# Patient Record
Sex: FEMALE | Race: BLACK OR AFRICAN AMERICAN | Employment: UNEMPLOYED | URBAN - METROPOLITAN AREA
[De-identification: names, ages, dates, MRNs, and addresses within clinical notes are randomized per-mention and may not be internally consistent; named-entity substitution may affect disease eponyms.]

---

## 2021-05-19 ENCOUNTER — HOSPITAL ENCOUNTER (EMERGENCY)
Age: 36
Discharge: HOME | End: 2021-05-19
Attending: EMERGENCY MEDICINE
Payer: COMMERCIAL

## 2021-05-19 ENCOUNTER — APPOINTMENT (OUTPATIENT)
Dept: GENERAL RADIOLOGY | Age: 36
End: 2021-05-19
Payer: COMMERCIAL

## 2021-05-19 VITALS
HEIGHT: 66 IN | DIASTOLIC BLOOD PRESSURE: 55 MMHG | WEIGHT: 225 LBS | OXYGEN SATURATION: 99 % | HEART RATE: 80 BPM | RESPIRATION RATE: 18 BRPM | TEMPERATURE: 97.3 F | SYSTOLIC BLOOD PRESSURE: 119 MMHG | BODY MASS INDEX: 36.16 KG/M2

## 2021-05-19 DIAGNOSIS — R07.9 CHEST PAIN, UNSPECIFIED TYPE: Primary | ICD-10-CM

## 2021-05-19 LAB
A/G RATIO: 1.2 (ref 1.1–2.2)
ALBUMIN SERPL-MCNC: 4.1 G/DL (ref 3.4–5)
ALP BLD-CCNC: 60 U/L (ref 40–129)
ALT SERPL-CCNC: 23 U/L (ref 10–40)
ANION GAP SERPL CALCULATED.3IONS-SCNC: 13 MMOL/L (ref 3–16)
AST SERPL-CCNC: 18 U/L (ref 15–37)
BASOPHILS ABSOLUTE: 0 K/UL (ref 0–0.2)
BASOPHILS RELATIVE PERCENT: 0.5 %
BILIRUB SERPL-MCNC: <0.2 MG/DL (ref 0–1)
BUN BLDV-MCNC: 5 MG/DL (ref 7–20)
CALCIUM SERPL-MCNC: 9.3 MG/DL (ref 8.3–10.6)
CHLORIDE BLD-SCNC: 104 MMOL/L (ref 99–110)
CO2: 21 MMOL/L (ref 21–32)
CREAT SERPL-MCNC: 0.8 MG/DL (ref 0.6–1.1)
D DIMER: <200 NG/ML DDU (ref 0–229)
EOSINOPHILS ABSOLUTE: 0.1 K/UL (ref 0–0.6)
EOSINOPHILS RELATIVE PERCENT: 1.3 %
GFR AFRICAN AMERICAN: >60
GFR NON-AFRICAN AMERICAN: >60
GLOBULIN: 3.3 G/DL
GLUCOSE BLD-MCNC: 105 MG/DL (ref 70–99)
HCG QUALITATIVE: NEGATIVE
HCT VFR BLD CALC: 39.2 % (ref 36–48)
HEMOGLOBIN: 12.4 G/DL (ref 12–16)
LYMPHOCYTES ABSOLUTE: 1.8 K/UL (ref 1–5.1)
LYMPHOCYTES RELATIVE PERCENT: 34.1 %
MCH RBC QN AUTO: 26 PG (ref 26–34)
MCHC RBC AUTO-ENTMCNC: 31.7 G/DL (ref 31–36)
MCV RBC AUTO: 82.1 FL (ref 80–100)
MONOCYTES ABSOLUTE: 0.4 K/UL (ref 0–1.3)
MONOCYTES RELATIVE PERCENT: 7 %
NEUTROPHILS ABSOLUTE: 3.1 K/UL (ref 1.7–7.7)
NEUTROPHILS RELATIVE PERCENT: 57.1 %
PDW BLD-RTO: 15.6 % (ref 12.4–15.4)
PLATELET # BLD: 245 K/UL (ref 135–450)
PMV BLD AUTO: 10 FL (ref 5–10.5)
POTASSIUM REFLEX MAGNESIUM: 3.6 MMOL/L (ref 3.5–5.1)
PRO-BNP: 14 PG/ML (ref 0–124)
RBC # BLD: 4.78 M/UL (ref 4–5.2)
SODIUM BLD-SCNC: 138 MMOL/L (ref 136–145)
TOTAL PROTEIN: 7.4 G/DL (ref 6.4–8.2)
TROPONIN: <0.01 NG/ML
WBC # BLD: 5.4 K/UL (ref 4–11)

## 2021-05-19 PROCEDURE — 84484 ASSAY OF TROPONIN QUANT: CPT

## 2021-05-19 PROCEDURE — 84703 CHORIONIC GONADOTROPIN ASSAY: CPT

## 2021-05-19 PROCEDURE — 85379 FIBRIN DEGRADATION QUANT: CPT

## 2021-05-19 PROCEDURE — 99283 EMERGENCY DEPT VISIT LOW MDM: CPT

## 2021-05-19 PROCEDURE — 6370000000 HC RX 637 (ALT 250 FOR IP): Performed by: EMERGENCY MEDICINE

## 2021-05-19 PROCEDURE — 94640 AIRWAY INHALATION TREATMENT: CPT

## 2021-05-19 PROCEDURE — 80053 COMPREHEN METABOLIC PANEL: CPT

## 2021-05-19 PROCEDURE — 71045 X-RAY EXAM CHEST 1 VIEW: CPT

## 2021-05-19 PROCEDURE — 85025 COMPLETE CBC W/AUTO DIFF WBC: CPT

## 2021-05-19 PROCEDURE — 83880 ASSAY OF NATRIURETIC PEPTIDE: CPT

## 2021-05-19 PROCEDURE — 93005 ELECTROCARDIOGRAM TRACING: CPT | Performed by: EMERGENCY MEDICINE

## 2021-05-19 RX ORDER — PREDNISONE 10 MG/1
50 TABLET ORAL DAILY
Qty: 25 TABLET | Refills: 0 | Status: SHIPPED | OUTPATIENT
Start: 2021-05-19 | End: 2021-05-19 | Stop reason: SDUPTHER

## 2021-05-19 RX ORDER — ALBUTEROL SULFATE 90 UG/1
2 AEROSOL, METERED RESPIRATORY (INHALATION) EVERY 6 HOURS PRN
Qty: 1 INHALER | Refills: 3 | Status: SHIPPED | OUTPATIENT
Start: 2021-05-19

## 2021-05-19 RX ORDER — MULTIVITAMIN WITH FOLIC ACID 400 MCG
TABLET ORAL
COMMUNITY
Start: 2021-03-23

## 2021-05-19 RX ORDER — IBUPROFEN 200 MG
600 TABLET ORAL EVERY 8 HOURS PRN
Qty: 60 TABLET | Refills: 0 | Status: SHIPPED | OUTPATIENT
Start: 2021-05-19

## 2021-05-19 RX ORDER — IBUPROFEN 200 MG
600 TABLET ORAL EVERY 8 HOURS PRN
Qty: 60 TABLET | Refills: 0 | Status: SHIPPED | OUTPATIENT
Start: 2021-05-19 | End: 2021-05-19 | Stop reason: SDUPTHER

## 2021-05-19 RX ORDER — IPRATROPIUM BROMIDE AND ALBUTEROL SULFATE 2.5; .5 MG/3ML; MG/3ML
1 SOLUTION RESPIRATORY (INHALATION) ONCE
Status: COMPLETED | OUTPATIENT
Start: 2021-05-19 | End: 2021-05-19

## 2021-05-19 RX ORDER — ALBUTEROL SULFATE 90 UG/1
2 AEROSOL, METERED RESPIRATORY (INHALATION) EVERY 6 HOURS PRN
Qty: 1 INHALER | Refills: 3 | Status: SHIPPED | OUTPATIENT
Start: 2021-05-19 | End: 2021-05-19 | Stop reason: SDUPTHER

## 2021-05-19 RX ORDER — PREDNISONE 10 MG/1
50 TABLET ORAL DAILY
Qty: 25 TABLET | Refills: 0 | Status: SHIPPED | OUTPATIENT
Start: 2021-05-19 | End: 2021-05-24

## 2021-05-19 RX ADMIN — IPRATROPIUM BROMIDE AND ALBUTEROL SULFATE 1 AMPULE: .5; 3 SOLUTION RESPIRATORY (INHALATION) at 14:29

## 2021-05-19 RX ADMIN — PREDNISONE 50 MG: 20 TABLET ORAL at 14:22

## 2021-05-19 ASSESSMENT — HEART SCORE: ECG: 0

## 2021-05-19 NOTE — ED PROVIDER NOTES
[facility] Emergency Department    CHIEF COMPLAINT  Chief Complaint   Patient presents with    Chest Pain     pt states living with ppl who have cold symptoms. pt states no dx of COVID for herself or family- today stating she has hx of asthma, having sharp pain in center of chest raidates to neck ongoing since Saturday- consistent in nature. HISTORY OF PRESENT ILLNESS  Areli Israel is a 38 y.o. female  who presents to the ED complaining of left sided and central chest pain radiating to the neck ongoing since Saturday. No fevers. Minimal cough but no sputum. H/o asthma but not really wheezing. Denies hemoptysis, leg swelling, or DVT/PE history. Previously on chronic steroids for h/o sarcoidosis but not anymore. No abd pain or n/v/d. She has been exposed to others with COVID like symptoms but has never had this herself. She denies nasal congestion, headache or body aches. No other complaints, modifying factors or associated symptoms. I have reviewed the following from the nursing documentation.     Past Medical History:   Diagnosis Date    Asthma     Liver disease     Sarcoidosis      Past Surgical History:   Procedure Laterality Date    APPENDECTOMY      OVARIAN CYST REMOVAL       Family History   Problem Relation Age of Onset    Cancer Father     Asthma Mother     Cancer Paternal Uncle     Cancer Maternal Grandmother     Arthritis Maternal Grandmother     Cancer Maternal Grandfather      Social History     Socioeconomic History    Marital status: Single     Spouse name: Not on file    Number of children: Not on file    Years of education: Not on file    Highest education level: Not on file   Occupational History    Not on file   Tobacco Use    Smoking status: Never Smoker   Substance and Sexual Activity    Alcohol use: No    Drug use: No    Sexual activity: Yes     Partners: Male   Other Topics Concern    Not on file   Social History Narrative    Not on file     Social Determinants of Health     Financial Resource Strain:     Difficulty of Paying Living Expenses:    Food Insecurity:     Worried About Running Out of Food in the Last Year:     [address] in the Last Year:    Transportation Needs:     Lack of Transportation (Medical):  Lack of Transportation (Non-Medical):    Physical Activity:     Days of Exercise per Week:     Minutes of Exercise per Session:    Stress:     Feeling of Stress :    Social Connections:     Frequency of Communication with Friends and Family:     Frequency of Social Gatherings with Friends and Family:     Attends Nondenominational Services:     Active Member of Clubs or Organizations:     Attends Club or Organization Meetings:     Marital Status:    Intimate Partner Violence:     Fear of Current or Ex-Partner:     Emotionally Abused:     Physically Abused:     Sexually Abused:      No current facility-administered medications for this encounter. Current Outpatient Medications   Medication Sig Dispense Refill    albuterol sulfate HFA (PROAIR HFA) 108 (90 Base) MCG/ACT inhaler Inhale 2 puffs into the lungs every 6 hours as needed for Wheezing 1 Inhaler 3    ibuprofen (ADVIL) 200 MG tablet Take 3 tablets by mouth every 8 hours as needed for Pain 60 tablet 0    predniSONE (DELTASONE) 10 MG tablet Take 5 tablets by mouth daily for 5 days 25 tablet 0    gabapentin (NEURONTIN) 100 MG capsule Take 100 mg by mouth 3 times daily      Multiple Vitamin (DAILY-SHREE) TABS       prednisoLONE acetate (PRED FORTE) 1 % ophthalmic suspension Place 1 drop into the right eye 4 times daily 15 mL 0    fluticasone (FLONASE) 50 MCG/ACT nasal spray 1 spray by Nasal route daily 1 Bottle 0     Allergies   Allergen Reactions    Amoxicillin Swelling    Pcn [Penicillins] Swelling       REVIEW OF SYSTEMS  10 systems reviewed, pertinent positives per HPI otherwise noted to be negative.     PHYSICAL EXAM  BP (!) 119/55   Pulse 80   Temp mmol/L    Potassium reflex Magnesium 3.6 3.5 - 5.1 mmol/L    Chloride 104 99 - 110 mmol/L    CO2 21 21 - 32 mmol/L    Anion Gap 13 3 - 16    Glucose 105 (H) 70 - 99 mg/dL    BUN 5 (L) 7 - 20 mg/dL    CREATININE 0.8 0.6 - 1.1 mg/dL    GFR Non-African American >60 >60    GFR African American >60 >60    Calcium 9.3 8.3 - 10.6 mg/dL    Total Protein 7.4 6.4 - 8.2 g/dL    Albumin 4.1 3.4 - 5.0 g/dL    Albumin/Globulin Ratio 1.2 1.1 - 2.2    Total Bilirubin <0.2 0.0 - 1.0 mg/dL    Alkaline Phosphatase 60 40 - 129 U/L    ALT 23 10 - 40 U/L    AST 18 15 - 37 U/L    Globulin 3.3 g/dL   hCG, serum, qualitative   Result Value Ref Range    hCG Qual Negative Detects HCG level >10 MIU/mL   Troponin   Result Value Ref Range    Troponin <0.01 <0.01 ng/mL   Brain Natriuretic Peptide   Result Value Ref Range    Pro-BNP 14 0 - 124 pg/mL   D-dimer, quantitative   Result Value Ref Range    D-Dimer, Quant <200 0 - 229 ng/mL DDU   EKG 12 Lead   Result Value Ref Range    Ventricular Rate 89 BPM    Atrial Rate 89 BPM    P-R Interval 130 ms    QRS Duration 80 ms    Q-T Interval 366 ms    QTc Calculation (Bazett) 445 ms    P Axis 71 degrees    R Axis 44 degrees    T Axis 18 degrees    Diagnosis       Normal sinus rhythmPossible Left atrial enlargementBorderline ECG     The 12 lead EKG was interpreted by me as follows:  Rate: normal with a rate of 89  Rhythm: sinus  Axis: normal  Intervals: normal TN, narrow QRS, normal QTc  ST segments: no ST elevations or depressions  T waves: no abnormal inversions  Non-specific T wave changes: not present  Prior EKG comparison: No prior is currently available for comparison    RADIOLOGY    XR CHEST PORTABLE    Result Date: 5/19/2021  EXAMINATION: ONE XRAY VIEW OF THE CHEST 5/19/2021 2:25 pm COMPARISON: None HISTORY: ORDERING SYSTEM PROVIDED HISTORY: cough wheeze L sided CP TECHNOLOGIST PROVIDED HISTORY: Reason for exam:->cough wheeze L sided CP Reason for Exam: Chest Pain (pt states living with ppl who have cold symptoms. pt states no dx of COVID for herself or family- today stating she has hx of asthma, having sharp pain in center of chest radiates to neck ongoing since Saturday- consistent in nature. ) Acuity: Acute Type of Exam: Initial FINDINGS: The bones and soft tissues are unremarkable. Cardiopericarditis silhouette is within normal limits. Pulmonary vasculature is normal.  No focal confluent pulmonary infiltrate is identified. Subtle density overlying the lower lung zones felt to be related to overlap associated with overlying soft tissue/patient's breasts. No evidence of pleural effusion or pneumothorax. No evidence of acute cardiopulmonary disease. ED COURSE/MDM  Patient seen and evaluated. Old records reviewed. Labs and imaging reviewed and results discussed with patient. After initial evaluation, differential diagnostic considerations included: acute coronary syndrome, pulmonary embolism, COPD/asthma, pneumonia, musculoskeletal, reflux/PUD/gastritis, pneumothorax, CHF, thoracic aortic dissection, anxiety    The patient's ED workup was notable for reproducible chest wall discomfort probably costochondritic, chest x-ray clear, D-dimer negative and low risk for PE, EKG unremarkable, low heart score, troponin negative. Treated with NSAIDs. She was given a breathing treatment and prednisone here. She was given an inhaler NSAIDs and prednisone burst for home. HEART SCORE:    History: 0  EC  Patient Age: 1  *Risk factors for Atherosclerotic disease: Obesity; Positive family History  Risk Factors: 1  Troponin: 0  Heart Score Total: 1      Heart score: 1.   This falls under the following category: Score of 0-3, which indicates a very low risk for major adverse cardiac event and supports early discharge      During the patient's ED course, the patient was given:  Medications   ipratropium-albuterol (DUONEB) nebulizer solution 1 ampule (1 ampule Inhalation Given 21 7144) predniSONE (DELTASONE) tablet 50 mg (50 mg Oral Given 5/19/21 1422)        CLINICAL IMPRESSION  1. Chest pain, unspecified type        Blood pressure (!) 119/55, pulse 80, temperature 97.3 °F (36.3 °C), temperature source Temporal, resp. rate 18, height 5' 6\" (1.676 m), weight 225 lb (102.1 kg), last menstrual period 04/30/2021, SpO2 99 %. Nancy Galicia was discharged to home in stable condition. I have discussed the findings of today's workup with the patient and addressed the patient's questions and concerns. Important warning signs as well as new or worsening symptoms which would necessitate immediate return to the ED were discussed. The plan is to discharge from the ED at this time, and the patient is in stable condition. The patient acknowledged understanding is agreeable with this plan. Patient was given scripts for the following medications. I counseled patient how to take these medications. Discharge Medication List as of 5/19/2021  3:49 PM      START taking these medications    Details   ibuprofen (ADVIL) 200 MG tablet Take 3 tablets by mouth every 8 hours as needed for Pain, Disp-60 tablet, R-0Print             Follow-up with:  [facility]    Schedule an appointment as soon as possible for a visit in 1 week  For symptom re-evaluation    [facility] Emergency Department  [address] [facility]  [address]  Go to   If symptoms worsen      DISCLAIMER: This chart was created using [employer] Efforts were made by me to ensure accuracy, however some errors may be present due to limitations of this technology and occasionally words are not transcribed correctly. \        Torin Morrissey MD  05/19/21 4699

## 2021-05-20 LAB
EKG ATRIAL RATE: 89 BPM
EKG DIAGNOSIS: NORMAL
EKG P AXIS: 71 DEGREES
EKG P-R INTERVAL: 130 MS
EKG Q-T INTERVAL: 366 MS
EKG QRS DURATION: 80 MS
EKG QTC CALCULATION (BAZETT): 445 MS
EKG R AXIS: 44 DEGREES
EKG T AXIS: 18 DEGREES
EKG VENTRICULAR RATE: 89 BPM

## 2021-05-20 PROCEDURE — 93010 ELECTROCARDIOGRAM REPORT: CPT | Performed by: INTERNAL MEDICINE

## 2021-06-07 ENCOUNTER — HOSPITAL ENCOUNTER (EMERGENCY)
Age: 36
Discharge: HOME | End: 2021-06-07
Payer: COMMERCIAL

## 2021-06-07 ENCOUNTER — APPOINTMENT (OUTPATIENT)
Dept: GENERAL RADIOLOGY | Age: 36
End: 2021-06-07
Payer: COMMERCIAL

## 2021-06-07 VITALS
RESPIRATION RATE: 16 BRPM | HEIGHT: 66 IN | WEIGHT: 225 LBS | DIASTOLIC BLOOD PRESSURE: 85 MMHG | HEART RATE: 94 BPM | SYSTOLIC BLOOD PRESSURE: 148 MMHG | BODY MASS INDEX: 36.16 KG/M2 | TEMPERATURE: 97.5 F | OXYGEN SATURATION: 100 %

## 2021-06-07 DIAGNOSIS — S99.922A INJURY OF LEFT FOOT, INITIAL ENCOUNTER: Primary | ICD-10-CM

## 2021-06-07 PROCEDURE — 73610 X-RAY EXAM OF ANKLE: CPT

## 2021-06-07 PROCEDURE — 73630 X-RAY EXAM OF FOOT: CPT

## 2021-06-07 PROCEDURE — 99283 EMERGENCY DEPT VISIT LOW MDM: CPT

## 2021-06-07 ASSESSMENT — ENCOUNTER SYMPTOMS
SHORTNESS OF BREATH: 0
NAUSEA: 0
VOMITING: 0
WHEEZING: 0
DIARRHEA: 0
COUGH: 0
RHINORRHEA: 0
ABDOMINAL PAIN: 0

## 2021-06-07 ASSESSMENT — PAIN SCALES - GENERAL: PAINLEVEL_OUTOF10: 7

## 2021-06-07 ASSESSMENT — PAIN DESCRIPTION - PAIN TYPE: TYPE: ACUTE PAIN

## 2021-06-08 NOTE — ED PROVIDER NOTES
Musculoskeletal: Positive for arthralgias. Negative for neck pain and neck stiffness. Skin: Negative for rash. Neurological: Negative for weakness, numbness and headaches. Positives and Pertinent negatives as per HPI. Except as noted above in the ROS, all other systems were reviewed and negative. PAST MEDICAL HISTORY     Past Medical History:   Diagnosis Date    Asthma     Liver disease     Sarcoidosis          SURGICAL HISTORY     Past Surgical History:   Procedure Laterality Date    APPENDECTOMY      OVARIAN CYST REMOVAL           CURRENTMEDICATIONS       Previous Medications    ALBUTEROL SULFATE HFA (PROAIR HFA) 108 (90 BASE) MCG/ACT INHALER    Inhale 2 puffs into the lungs every 6 hours as needed for Wheezing    FLUTICASONE (FLONASE) 50 MCG/ACT NASAL SPRAY    1 spray by Nasal route daily    IBUPROFEN (ADVIL) 200 MG TABLET    Take 3 tablets by mouth every 8 hours as needed for Pain    MULTIPLE VITAMIN (DAILY-SHREE) TABS        PREDNISOLONE ACETATE (PRED FORTE) 1 % OPHTHALMIC SUSPENSION    Place 1 drop into the right eye 4 times daily         ALLERGIES     Amoxicillin and Pcn [penicillins]    FAMILYHISTORY       Family History   Problem Relation Age of Onset    Cancer Father     Asthma Mother     Cancer Paternal Uncle     Cancer Maternal Grandmother     Arthritis Maternal Grandmother     Cancer Maternal Grandfather           SOCIAL HISTORY       Social History     Tobacco Use    Smoking status: Never Smoker    Smokeless tobacco: Never Used   Substance Use Topics    Alcohol use: No    Drug use: No       SCREENINGS             PHYSICAL EXAM    (up to 7 for level 4, 8 or more for level 5)     ED Triage Vitals [06/07/21 2130]   BP Temp Temp Source Pulse Resp SpO2 Height Weight   (!) 148/85 97.5 °F (36.4 °C) Infrared 94 16 100 % 5' 6\" (1.676 m) 225 lb (102.1 kg)       Physical Exam  Vitals and nursing note reviewed. Constitutional:       Appearance: She is well-developed.  She is not diaphoretic. HENT:      Head: Normocephalic and atraumatic. Right Ear: External ear normal.      Left Ear: External ear normal.      Nose: Nose normal.   Eyes:      General:         Right eye: No discharge. Left eye: No discharge. Cardiovascular:      Pulses: Normal pulses. Pulmonary:      Effort: Pulmonary effort is normal. No respiratory distress. Musculoskeletal:         General: Normal range of motion. Cervical back: Normal range of motion and neck supple. Left foot: Swelling and tenderness present. Feet:    Skin:     General: Skin is warm and dry. Neurological:      Mental Status: She is alert and oriented to person, place, and time. Sensory: Sensation is intact. Motor: Motor function is intact. Psychiatric:         Behavior: Behavior normal.         DIAGNOSTIC RESULTS   LABS:    Labs Reviewed - No data to display    All other labs were within normal range or not returned as of this dictation. EKG: All EKG's are interpreted by the Emergency Department Physician in the absence of a cardiologist.  Please see their note for interpretation of EKG. RADIOLOGY:   Non-plain film images such as CT, Ultrasound and MRI are read by the radiologist. Plain radiographic images are visualized and preliminarily interpreted by the ED Provider with the below findings:        Interpretation per the Radiologist below, if available at the time of this note:    XR FOOT LEFT (MIN 3 VIEWS)   Final Result   No acute abnormality seen. XR ANKLE LEFT (MIN 3 VIEWS)   Final Result   No abnormality seen. No results found.         PROCEDURES   Unless otherwise noted below, none     Procedures    CRITICAL CARE TIME   N/A    CONSULTS:  None      EMERGENCY DEPARTMENT COURSE and DIFFERENTIAL DIAGNOSIS/MDM:   Vitals:    Vitals:    06/07/21 2130   BP: (!) 148/85   Pulse: 94   Resp: 16   Temp: 97.5 °F (36.4 °C)   TempSrc: Infrared   SpO2: 100%   Weight: 225 lb (102.1 kg) Height: 5' 6\" (1.676 m)       Patient was given the following medications:  Medications - No data to display        Patient presents for evaluation of left foot pain status post injury that occurred yesterday. On exam, she is resting comfortably in bed no acute distress and nontoxic. Nose are stable and she is afebrile. She does have tenderness and mild swelling to the plantar aspect of the left foot as illustrated above. Also mild tenderness over the medial malleoli. There is no step-offs crepitus obvious deformity or dislocation. Range of motion of ankle is intact. She is able to wiggle her toes. X-ray left foot and ankle show no acute abnormalities. She was given Ace wrap and postop shoe for symptomatic and supportive care. Additional symptomatic and supportive care was discussed including rest, ice elevation. She can take Tylenol and/or ibuprofen. Encourage follow-up with PCP within 1 week if there is no significant symptomatic improvement. I estimate there is LOW risk for COMPARTMENT SYNDROME, DEEP VENOUS THROMBOSIS, SEPTIC ARTHRITIS, TENDON OR NEUROVASCULAR INJURY, thus I consider the discharge disposition reasonable. Conditions for return to the ED were discussed such as any new or worsening symptoms or signs of neurovascular compromise, intractable pain or inability to ambulate. .  She is agreeable to this plan and stable for discharge at this time. FINAL IMPRESSION      1.  Injury of left foot, initial encounter          DISPOSITION/PLAN   DISPOSITION Decision To Discharge 06/07/2021 10:53:43 PM      PATIENT REFERRED TO:  [place]    Call   For a re-check in  5-7  days if no improvement    [facility] Emergency Department  [place] [address]  [phone]  Go to   If symptoms worsen      DISCHARGE MEDICATIONS:  New Prescriptions    No medications on file       DISCONTINUED MEDICATIONS:  Discontinued Medications    GABAPENTIN (NEURONTIN) 100 MG CAPSULE    Take 100 mg by mouth 3 times daily              (Please note that portions of this note were completed with a voice recognition program.  Efforts were made to edit the dictations but occasionally words are mis-transcribed.)    France Tay PA-C (electronically signed)            Agustin Rodriguez PA-C  06/07/21 [id number]

## 2021-06-08 NOTE — ED NOTES
Nursing Discharge Notes:  -Patient discharged at this time in no acute distress after verbalizing understanding of discharge instructions.  -A copy of the AVS was reviewed with pt.  -Pt was given the opportunity to ask questions before signing for discharge.     -Pt left ambulatory to lobby / discharge area.      Patient Education:  Learner - Patient  Motivation and Readiness To Learn - Medium to High  Barriers To Learning - None  Learning Preference / Provided Instructions - Both written and verbal discharge instructions.      Fátima Vale RN  06/07/21 6364

## 2021-06-29 ENCOUNTER — APPOINTMENT (OUTPATIENT)
Dept: GENERAL RADIOLOGY | Age: 36
End: 2021-06-29
Payer: COMMERCIAL

## 2021-06-29 ENCOUNTER — HOSPITAL ENCOUNTER (EMERGENCY)
Age: 36
Discharge: HOME | End: 2021-06-29
Payer: COMMERCIAL

## 2021-06-29 VITALS
WEIGHT: 225 LBS | TEMPERATURE: 98.5 F | OXYGEN SATURATION: 98 % | SYSTOLIC BLOOD PRESSURE: 121 MMHG | HEIGHT: 66 IN | RESPIRATION RATE: 16 BRPM | DIASTOLIC BLOOD PRESSURE: 57 MMHG | HEART RATE: 92 BPM | BODY MASS INDEX: 36.16 KG/M2

## 2021-06-29 DIAGNOSIS — M79.672 LEFT FOOT PAIN: Primary | ICD-10-CM

## 2021-06-29 DIAGNOSIS — M25.562 LEFT KNEE PAIN, UNSPECIFIED CHRONICITY: ICD-10-CM

## 2021-06-29 PROCEDURE — 93971 EXTREMITY STUDY: CPT

## 2021-06-29 PROCEDURE — 6370000000 HC RX 637 (ALT 250 FOR IP): Performed by: PHYSICIAN ASSISTANT

## 2021-06-29 PROCEDURE — 99283 EMERGENCY DEPT VISIT LOW MDM: CPT

## 2021-06-29 PROCEDURE — 73562 X-RAY EXAM OF KNEE 3: CPT

## 2021-06-29 PROCEDURE — 73630 X-RAY EXAM OF FOOT: CPT

## 2021-06-29 RX ORDER — HYDROCODONE BITARTRATE AND ACETAMINOPHEN 5; 325 MG/1; MG/1
1 TABLET ORAL ONCE
Status: COMPLETED | OUTPATIENT
Start: 2021-06-29 | End: 2021-06-29

## 2021-06-29 RX ADMIN — HYDROCODONE BITARTRATE AND ACETAMINOPHEN 1 TABLET: 5; 325 TABLET ORAL at 14:11

## 2021-06-29 ASSESSMENT — PAIN SCALES - GENERAL
PAINLEVEL_OUTOF10: 10
PAINLEVEL_OUTOF10: 10

## 2021-06-29 ASSESSMENT — ENCOUNTER SYMPTOMS
VOMITING: 0
NAUSEA: 0

## 2021-06-29 NOTE — ED PROVIDER NOTES
[facility]        Pt Name: Cassidy Arnett  MRN: [id number]  [place] 1985  Date of evaluation: 6/29/2021  Provider: Bautista Ocampo PA-C  PCP: [facility]  Note Started: 4:47 PM EDT       VERO. I have evaluated this patient. My supervising physician was available for consultation. CHIEF COMPLAINT       Chief Complaint   Patient presents with    Foot Injury     hurt herself at [address] 6/7, patient was seen here, now having right knee and right foot problems, pt reports that she didnt get into Ortho        HISTORY OF PRESENT ILLNESS   (Location, Timing/Onset, Context/Setting, Quality, Duration, Modifying Factors, Severity, Associated Signs and Symptoms)  Note limiting factors. Chief Complaint: Left knee, and left foot pain. Cassidy Arnett is a 38 y.o. female who presents to the emergency department today for evaluation for left knee and left foot pain. The patient states that she injured herself by falling at [address] on 6/6/2021, the patient states that she was seen here the next day, had x-rays, her x-rays were normal.  The patient states that she has kept her boot on, and she states that overall seem to be doing well, however after trying to take the boot off and return to normal activity she has noticed increasing pain. The patient states that the other day when she stood up off the couch she noticed increasing pain to her left knee, swelling and some pain to her calf. The patient is rating all of her discomfort as a 10/10, her pain is constant, worse with touch and certain movements. She denies any numbness, tingling or weakness. No fever chills. No nausea or vomiting, the patient otherwise has no complaints or injuries at this time    Nursing Notes were all reviewed and agreed with or any disagreements were addressed in the HPI.     REVIEW OF SYSTEMS    (2-9 systems for level 4, 10 or more for level 5) Review of Systems   Constitutional: Negative for activity change, appetite change and fever. Gastrointestinal: Negative for nausea and vomiting. Musculoskeletal: Positive for arthralgias. Skin: Negative for wound. Neurological: Negative for weakness and numbness. Positives and Pertinent negatives as per HPI. Except as noted above in the ROS, all other systems were reviewed and negative.        PAST MEDICAL HISTORY     Past Medical History:   Diagnosis Date    Asthma     Liver disease     Sarcoidosis          SURGICAL HISTORY     Past Surgical History:   Procedure Laterality Date    APPENDECTOMY      OVARIAN CYST REMOVAL           CURRENTMEDICATIONS       Discharge Medication List as of 6/29/2021  4:11 PM      CONTINUE these medications which have NOT CHANGED    Details   Multiple Vitamin (DAILY-SHREE) TABS Historical Med      albuterol sulfate HFA (PROAIR HFA) 108 (90 Base) MCG/ACT inhaler Inhale 2 puffs into the lungs every 6 hours as needed for Wheezing, Disp-1 Inhaler, R-3Print      ibuprofen (ADVIL) 200 MG tablet Take 3 tablets by mouth every 8 hours as needed for Pain, Disp-60 tablet, R-0Print      prednisoLONE acetate (PRED FORTE) 1 % ophthalmic suspension Place 1 drop into the right eye 4 times daily, Disp-15 mL, R-0      fluticasone (FLONASE) 50 MCG/ACT nasal spray 1 spray by Nasal route daily, Disp-1 Bottle, R-0               ALLERGIES     Amoxicillin and Pcn [penicillins]    FAMILYHISTORY       Family History   Problem Relation Age of Onset    Cancer Father     Asthma Mother     Cancer Paternal Uncle     Cancer Maternal Grandmother     Arthritis Maternal Grandmother     Cancer Maternal Grandfather           SOCIAL HISTORY       Social History     Tobacco Use    Smoking status: Never Smoker    Smokeless tobacco: Never Used   Substance Use Topics    Alcohol use: No    Drug use: No       SCREENINGS             PHYSICAL EXAM    (up to 7 for level 4, 8 or more for level 5)     ED Triage Vitals [06/29/21 1350]   BP Temp Temp src Pulse Resp SpO2 Height Weight   (!) 121/57 98.5 °F (36.9 °C) -- 92 16 98 % 5' 6\" (1.676 m) 225 lb (102.1 kg)       Physical Exam  Vitals and nursing note reviewed. Constitutional:       Appearance: She is well-developed. She is not diaphoretic. HENT:      Head: Normocephalic and atraumatic. Right Ear: External ear normal.      Left Ear: External ear normal.      Nose: Nose normal.   Eyes:      General:         Right eye: No discharge. Left eye: No discharge. Neck:      Trachea: No tracheal deviation. Cardiovascular:      Pulses: Normal pulses. Pulmonary:      Effort: Pulmonary effort is normal. No respiratory distress. Musculoskeletal:         General: Normal range of motion. Cervical back: Normal range of motion and neck supple. Comments: There is diffuse tenderness noted about the left foot, and over the left knee. Dorsalis pedis and posterior tibialis pulse are 2+. Normal sensation light touch per neurovascularly intact. Full passive range of motion of all joints. Patient has tenderness to palpation to her posterior calf. Negative Homans' sign. No erythema, warmth or edema over the joints. Skin:     General: Skin is warm and dry. Neurological:      Mental Status: She is alert and oriented to person, place, and time. Psychiatric:         Behavior: Behavior normal.         DIAGNOSTIC RESULTS   LABS:    Labs Reviewed - No data to display    When ordered only abnormal lab results are displayed. All other labs were within normal range or not returned as of this dictation. EKG: When ordered, EKG's are interpreted by the Emergency Department Physician in the absence of a cardiologist.  Please see their note for interpretation of EKG.     RADIOLOGY:   Non-plain film images such as CT, Ultrasound and MRI are read by the radiologist. Plain radiographic images are visualized and preliminarily interpreted by the ED Provider with the below findings:        Interpretation per the Radiologist below, if available at the time of this note:    VL Extremity Venous Left         XR KNEE LEFT (3 VIEWS)   Final Result   No acute bone or joint abnormality. XR FOOT LEFT (MIN 3 VIEWS)   Final Result   No acute bone or joint abnormality. XR KNEE LEFT (3 VIEWS)    Result Date: 6/29/2021  EXAMINATION: THREE XRAY VIEWS OF THE LEFT KNEE 6/29/2021 2:19 pm COMPARISON: None. HISTORY: ORDERING SYSTEM PROVIDED HISTORY: pain TECHNOLOGIST PROVIDED HISTORY: Reason for exam:->pain Reason for Exam: Foot Injury (hurt herself at [facility] 6/7, patient was seen here, now having right knee and right foot problems, pt reports that she didnt get into Ortho ) Acuity: Acute Type of Exam: Subsequent/Follow-up FINDINGS: There is no acute fracture or dislocation. Joint spaces are unremarkable for age. Surrounding soft tissues are unremarkable. No significant knee joint effusion. No acute bone or joint abnormality. XR FOOT LEFT (MIN 3 VIEWS)    Result Date: 6/29/2021  EXAMINATION: THREE XRAY VIEWS OF THE LEFT FOOT 6/29/2021 2:19 pm COMPARISON: Left ankle same day: HISTORY: ORDERING SYSTEM PROVIDED HISTORY: pain TECHNOLOGIST PROVIDED HISTORY: Reason for exam:->pain Reason for Exam: Foot Injury (hurt herself at [facility] 6/7, patient was seen here, now having right knee and right foot problems, pt reports that she didnt get into Ortho ) Acuity: Acute Type of Exam: Subsequent/Follow-up FINDINGS: There is no acute fracture or dislocation. Joint spaces are unremarkable for age. Surrounding soft tissues are unremarkable. No acute bone or joint abnormality.      VL Extremity Venous Left    Result Date: 6/29/2021  Vascular Lower Extremities DVT Study Procedure -- PRELIMINARY SONOGRAPHER REPORT --   Demographics   Patient Name      Yamil Newton   Date of Study     06/29/2021         Gender              Female   Patient Number    [id number]         Date of Birth       1985   Visit Number      [id number]          Age                 38 year(s)   Accession Number  [id number]          Room Number            Corporate ID      [id number]           Sonographer         Samira Wood,                                                           [address]   Ordering          Indra Montalvo        [facility]  Physician         VERONIQUE               Physician  Procedure Type of Study:   Veins:Lower Extremities DVT Study, VL EXTREMITY VENOUS DUPLEX LEFT. Tech Comments Left No evidence of deep vein or superficial vein thrombosis involving the left lower extremity and the right common femoral vein. PROCEDURES   Unless otherwise noted below, none     Procedures    CRITICAL CARE TIME   N/A    CONSULTS:  None      EMERGENCY DEPARTMENT COURSE and DIFFERENTIAL DIAGNOSIS/MDM:   Vitals:    Vitals:    06/29/21 1350   BP: (!) 121/57   Pulse: 92   Resp: 16   Temp: 98.5 °F (36.9 °C)   SpO2: 98%   Weight: 225 lb (102.1 kg)   Height: 5' 6\" (1.676 m)       Patient was given the following medications:  Medications   HYDROcodone-acetaminophen (NORCO) 5-325 MG per tablet 1 tablet (1 tablet Oral Given 6/29/21 1411)           Briefly, this is a 42-year-old female who presents the emergency department today for evaluation of her left foot and left ankle pain. The patient states that she was seen in the emergency room on 6/7/2021 after falling and injuring herself at [facility]. The patient states that she has not yet followed up with orthopedics or any other specialty. She states that she try to get up a couple of days ago and noticed increasing pain to her left knee    On examination, the patient has diffuse tenderness over the left foot over the left knee, she is neurovascular intact foot x-rays obtained and are negative.     Patient is reporting some pain to the calf, therefore ultrasound was obtained due to her recent injury, which was negative for any DVT      I feel that

## 2021-08-29 ENCOUNTER — HOSPITAL ENCOUNTER (EMERGENCY)
Age: 36
Discharge: HOME | End: 2021-08-30
Payer: COMMERCIAL

## 2021-08-29 ENCOUNTER — APPOINTMENT (OUTPATIENT)
Dept: GENERAL RADIOLOGY | Age: 36
End: 2021-08-29
Payer: COMMERCIAL

## 2021-08-29 DIAGNOSIS — K59.00 CONSTIPATION, UNSPECIFIED CONSTIPATION TYPE: Primary | ICD-10-CM

## 2021-08-29 DIAGNOSIS — J45.909 UNCOMPLICATED ASTHMA, UNSPECIFIED ASTHMA SEVERITY, UNSPECIFIED WHETHER PERSISTENT: ICD-10-CM

## 2021-08-29 LAB — HCG(URINE) PREGNANCY TEST: NEGATIVE

## 2021-08-29 PROCEDURE — 74022 RADEX COMPL AQT ABD SERIES: CPT

## 2021-08-29 PROCEDURE — 99283 EMERGENCY DEPT VISIT LOW MDM: CPT

## 2021-08-29 PROCEDURE — 84703 CHORIONIC GONADOTROPIN ASSAY: CPT

## 2021-08-29 ASSESSMENT — PAIN DESCRIPTION - DESCRIPTORS: DESCRIPTORS: ACHING;TIGHTNESS

## 2021-08-29 ASSESSMENT — PAIN DESCRIPTION - ORIENTATION: ORIENTATION: MID;UPPER

## 2021-08-29 ASSESSMENT — PAIN DESCRIPTION - PAIN TYPE: TYPE: ACUTE PAIN

## 2021-08-29 ASSESSMENT — PAIN DESCRIPTION - LOCATION: LOCATION: BACK

## 2021-08-29 ASSESSMENT — PAIN SCALES - GENERAL: PAINLEVEL_OUTOF10: 8

## 2021-08-30 VITALS
BODY MASS INDEX: 34.5 KG/M2 | WEIGHT: 219.8 LBS | HEART RATE: 103 BPM | RESPIRATION RATE: 14 BRPM | TEMPERATURE: 99.6 F | DIASTOLIC BLOOD PRESSURE: 70 MMHG | SYSTOLIC BLOOD PRESSURE: 113 MMHG | OXYGEN SATURATION: 100 % | HEIGHT: 67 IN

## 2021-08-30 RX ORDER — METHYLPREDNISOLONE 4 MG/1
TABLET ORAL
Qty: 1 KIT | Refills: 0 | Status: SHIPPED | OUTPATIENT
Start: 2021-08-30 | End: 2021-09-05

## 2021-08-30 NOTE — ED PROVIDER NOTES
This result has been copied by Nicole Shore. The result was interpreted by Yas Hinkle, OD on 10/28/19.  The original documentation can be found under the encounter date 10/28/19.  Plan: 24-2 visual field and IOP check, OCT-TODAY /CONTINUE LATANOPROST QHS OU     AS: MILD ARMD OU  PL OCT SCANS TODAY     PRIOR PACH SHOWS THIN CORNEAS OU             OCT SCANS TODAY SHOW DRY ARMD OD WET ARMD OS/SCANS OF GCC SHOW THINNING INFERIOR OD AND NASAL OS/SCANS OF RNFL SHOW INFERIOR THINNING RT EYE AND SUPERIOR THINNING LEFT EYE/GPA LOOKS VERY STABLE SINCE 2018 WHEN BEGAN TREATMENT/VISUAL FIELD GOOD COOPERATION OU  BOTH EYES SUPERIOR SCOTOMA BUT NOT WORSE THAN LAST FIELD,CONSISTENT WITH OCT SCANS RT EYE BUT NOT LEFT EYE  IOP AT [facility]/CONTINUE DROPS RT 6 MONTH IOP THEN 1 YEAR FOR FULL VF AND OCT    RETINA CONSULT      chest pain, palpitations. Gastrointestinal: Positive for constipation. Negative for vomiting, abdominal pain, blood in stool. Musculoskeletal:  Negative for myalgias, arthralgias, neck pain or stiffness. Neurological:  Negative for syncope, dizziness, focal weakness, numbness. All other positives and pertinent negatives as per HPI. PAST MEDICAL HISTORY         Diagnosis Date    Asthma     Liver disease     Sarcoidosis        SURGICAL HISTORY           Procedure Laterality Date    APPENDECTOMY      OVARIAN CYST REMOVAL         CURRENT MEDICATIONS       Discharge Medication List as of 2021 12:45 AM      CONTINUE these medications which have NOT CHANGED    Details   Multiple Vitamin (DAILY-SHREE) TABS Historical Med      albuterol sulfate HFA (PROAIR HFA) 108 (90 Base) MCG/ACT inhaler Inhale 2 puffs into the lungs every 6 hours as needed for Wheezing, Disp-1 Inhaler, R-3Print      ibuprofen (ADVIL) 200 MG tablet Take 3 tablets by mouth every 8 hours as needed for Pain, Disp-60 tablet, R-0Print      prednisoLONE acetate (PRED FORTE) 1 % ophthalmic suspension Place 1 drop into the right eye 4 times daily, Disp-15 mL, R-0      fluticasone (FLONASE) 50 MCG/ACT nasal spray 1 spray by Nasal route daily, Disp-1 Bottle, R-0             ALLERGIES     Amoxicillin and Pcn [penicillins]    FAMILY HISTORY           Problem Relation Age of Onset    Cancer Father     Asthma Mother     Cancer Paternal Uncle     Cancer Maternal Grandmother     Arthritis Maternal Grandmother     Cancer Maternal Grandfather      Family Status   Relation Name Status    Father      Mother  (Not Specified)    PUnc  (Not Specified)    MGM  (Not Specified)    MGF  (Not Specified)        SOCIAL HISTORY      reports that she has never smoked. She has never used smokeless tobacco. She reports that she does not drink alcohol and does not use drugs.     PHYSICAL EXAM    (up to 7 for level 4, 8 or more for level 5)     ED Triage Vitals [08/29/21 2052]   BP Temp Temp Source Pulse Resp SpO2 Height Weight   128/81 100.1 °F (37.8 °C) Temporal 119 18 98 % 5' 6.75\" (1.695 m) 219 lb 12.8 oz (99.7 kg)       Constitutional:  Appearing well-developed and well-nourished. No distress. Cardiovascular:  Normal rate, regular rhythm, normal heart sounds and intact distal pulses. Pulmonary/Chest:  Effort normal and breath sounds normal. No respiratory distress. Abdomen: Soft. Bowel sounds normal.  Negative for distention, tenderness, rebound, guarding or mass. Musculoskeletal:  Normal range of motion. No edema exhibited. Neurological:  Oriented to person, place, and time. No cranial nerve deficit observed. DIAGNOSTIC RESULTS     When ordered, EKGs are interpreted by the ED physician in the absence of a cardiologist. Please the physician's note for interpretation of EKG. RADIOLOGY:     Interpretation per the Radiologist below, if available at the time of this note:    XR ACUTE ABD SERIES CHEST 1 VW   Final Result   No radiographic evidence of acute pulmonary disease. Nonobstructive bowel   gas pattern. LABS:  Labs Reviewed   PREGNANCY, URINE    Narrative:     Performed at:  [address] [address]   Phone [phone]       All other labs were within normal range or not returned as of this dictation. EMERGENCY DEPARTMENT COURSE and DIFFERENTIAL DIAGNOSIS/MDM:   Vitals:    Vitals:    08/29/21 2052 08/30/21 0049   BP: 128/81 113/70   Pulse: 119 103   Resp: 18 14   Temp: 100.1 °F (37.8 °C) 99.6 °F (37.6 °C)   TempSrc: Temporal Oral   SpO2: 98% 100%   Weight: 219 lb 12.8 oz (99.7 kg)    Height: 5' 6.75\" (1.695 m)        The patient's condition in the ED was good. She is not short of breath while in the ED, showed no signs of respiratory distress, and her lungs were clear to auscultation.   No abdominal tenderness on my exam.  She says she is passing some stool.  Acute abdominal series x-ray showed no acute findings, no evidence of bowel obstruction. There was no indication for hospitalization or further workup. Patient will be prescribed a short course of steroids for asthma and will be advised to follow-up with primary care. The patient verbalized understanding and agreement with this plan of care. The patient was advised to return to the emergency department if symptoms should significantly worsen or if new and concerning symptoms should appear. I estimate there is LOW risk for ACUTE APPENDICITIS, BOWEL OBSTRUCTION, CHOLECYSTITIS, DIVERTICULITIS, INCARCERATED HERNIA, PANCREATITIS, PERITONITIS, PELVIC INFLAMMATORY DISEASE, OVARIAN TORSION, PERFORATED BOWEL, BOWEL ISCHEMIA, CARDIAC ISCHEMIA, ECTOPIC PREGNANCY, TUBO-OVARIAN ABSCESS, or SEPSIS, thus I consider the discharge disposition reasonable. PROCEDURES:  None    FINAL IMPRESSION      1. Constipation, unspecified constipation type    2.  Uncomplicated asthma, unspecified asthma severity, unspecified whether persistent          DISPOSITION/PLAN   DISPOSITION Decision To Discharge 08/30/2021 12:37:33 AM      PATIENT REFERRED TO:  your family doctor or primary care provider    Call   as needed, for follow-up care     [address]  [phone]  Call   to get a family doctor, if needed      DISCHARGE MEDICATIONS:  Discharge Medication List as of 8/30/2021 12:45 AM      START taking these medications    Details   methylPREDNISolone (MEDROL, ASHLEY,) 4 MG tablet Take by mouth., Disp-1 kit, R-0Print             (Please note that portions of this note were completed with a voice recognition program.  Efforts were made to edit the dictations but occasionally words are mis-transcribed.)    Natalia Martinez, [address] [place], [place]  08/30/21 9736

## 2021-08-30 NOTE — ED NOTES
.Pt discharged at this time. Discharge instructions and medications reviewed,  Questions were answered. PT verbalized understanding. Follow up appointments were discussed.          Lackawaxen Lorie, [address] [facility]  08/30/21 3694

## 2024-07-03 ENCOUNTER — OFFICE VISIT (OUTPATIENT)
Age: 39
End: 2024-07-03

## 2024-07-03 VITALS
TEMPERATURE: 98 F | OXYGEN SATURATION: 98 % | SYSTOLIC BLOOD PRESSURE: 115 MMHG | HEIGHT: 66 IN | HEART RATE: 91 BPM | BODY MASS INDEX: 32.47 KG/M2 | WEIGHT: 202 LBS | DIASTOLIC BLOOD PRESSURE: 79 MMHG

## 2024-07-03 DIAGNOSIS — N76.0 ACUTE VAGINITIS: Primary | ICD-10-CM

## 2024-07-03 LAB
APPEARANCE FLUID: CLEAR
BILIRUBIN, POC: NEGATIVE
BLOOD URINE, POC: ABNORMAL
CLARITY, POC: CLEAR
COLOR, POC: YELLOW
GLUCOSE URINE, POC: NEGATIVE
KETONES, POC: NEGATIVE
LEUKOCYTE EST, POC: ABNORMAL
NITRITE, POC: NEGATIVE
PH, POC: 6
PROTEIN, POC: ABNORMAL
SPECIFIC GRAVITY, POC: 1.02
UROBILINOGEN, POC: 0.2

## 2024-07-03 RX ORDER — DULAGLUTIDE 0.75 MG/.5ML
0.75 INJECTION, SOLUTION SUBCUTANEOUS WEEKLY
COMMUNITY

## 2024-07-03 RX ORDER — FLUCONAZOLE 150 MG/1
TABLET ORAL
Qty: 2 TABLET | Refills: 0 | Status: SHIPPED | OUTPATIENT
Start: 2024-07-03

## 2024-07-03 NOTE — PROGRESS NOTES
Edwar Shane (:  1985) is a 38 y.o. female,New patient, here for evaluation of the following chief complaint(s):  Vaginal Discharge (Vaginal discharge and burning, swelling x 1 week)      ASSESSMENT/PLAN:    ICD-10-CM    1. Acute vaginitis  N76.0 POCT Urinalysis no Micro     VAGINAL PATHOGENS PROBE *A     fluconazole (DIFLUCAN) 150 MG tablet     C.trachomatis N.gonorrhoeae DNA, Urine     Culture, Urine        Results for POC orders placed in visit on 24   POCT Urinalysis no Micro   Result Value Ref Range    Color, UA yellow     Clarity, UA clear     Glucose, UA POC negative     Bilirubin, UA negative     Ketones, UA negative     Spec Grav, UA 1.025     Blood, UA POC large     pH, UA 6.0     Protein, UA POC 30 mg/dl     Urobilinogen, UA 0.2     Leukocytes, UA moderate     Nitrite, UA negative     Appearance, Fluid Clear Clear, Slightly Cloudy     Patient is experiencing acute vaginitis. This is likely caused by receiving two antibiotics in the ER on  and possibly from using a new soap in her genital area. She was tested for STIs at that time, but requests to be tested again due to the symptoms. She provided a urine sample for gonorrhea and chlamydia testing and self-swabbed for trichomonas, BV, and yeast. Denies any urinary specific symptoms, but UA showed moderate leukocytes and nitrites, so a culture will be sent to determine plan of care. She was prescribed Diflucan at this time. Encouraged her to increase fluids, refrain from using any soaps in her genital area, taking baths, and any intercourse until her results have returned. Patient is understanding and agreeable to this plan.     Dx Disposition: yeast, BV, STI  Education and handout provided on diagnosis and management of symptoms.   AVS reviewed with patient. Follow up as needed in UC or with PCP for new or worsening symptoms.   Return if symptoms worsen or fail to improve.    SUBJECTIVE/OBJECTIVE:  Patient presents to the clinic with

## 2024-07-04 LAB
CANDIDA DNA VAG QL NAA+PROBE: ABNORMAL
G VAGINALIS DNA SPEC QL NAA+PROBE: ABNORMAL
T VAGINALIS DNA VAG QL NAA+PROBE: ABNORMAL

## 2024-07-05 LAB
BACTERIA UR CULT: NORMAL
C TRACH DNA UR QL NAA+PROBE: NEGATIVE
N GONORRHOEA DNA UR QL NAA+PROBE: NEGATIVE

## 2024-12-24 ENCOUNTER — OFFICE VISIT (OUTPATIENT)
Age: 39
End: 2024-12-24

## 2024-12-24 VITALS
SYSTOLIC BLOOD PRESSURE: 136 MMHG | WEIGHT: 214 LBS | DIASTOLIC BLOOD PRESSURE: 87 MMHG | HEART RATE: 86 BPM | BODY MASS INDEX: 34.54 KG/M2 | TEMPERATURE: 98.3 F | OXYGEN SATURATION: 98 %

## 2024-12-24 DIAGNOSIS — K08.89 PAIN, DENTAL: Primary | ICD-10-CM

## 2024-12-24 RX ORDER — CLINDAMYCIN HYDROCHLORIDE 150 MG/1
450 CAPSULE ORAL 3 TIMES DAILY
Qty: 63 CAPSULE | Refills: 0 | Status: SHIPPED | OUTPATIENT
Start: 2024-12-24 | End: 2024-12-31

## 2024-12-24 RX ORDER — CHLORHEXIDINE GLUCONATE ORAL RINSE 1.2 MG/ML
15 SOLUTION DENTAL 2 TIMES DAILY
Qty: 420 ML | Refills: 0 | Status: SHIPPED | OUTPATIENT
Start: 2024-12-24 | End: 2025-01-07

## 2024-12-24 ASSESSMENT — ENCOUNTER SYMPTOMS
NAUSEA: 0
VOMITING: 0
SHORTNESS OF BREATH: 0
CONSTIPATION: 0
COUGH: 0
CHEST TIGHTNESS: 0
DIARRHEA: 0
ABDOMINAL PAIN: 0

## 2024-12-24 NOTE — PATIENT INSTRUCTIONS
Flo Monroy:   [address], [address]  [place] [address]  [phone]   Open M-th: 8-1 Friday: 8-10:30

## 2024-12-24 NOTE — PROGRESS NOTES
Edwar Shane (:  1985) is a 39 y.o. female,New patient, here for evaluation of the following chief complaint(s):  Dental Pain      ASSESSMENT/PLAN:    ICD-10-CM    1. Pain, dental  K08.89 chlorhexidine (PERIDEX) 0.12 % solution     clindamycin (CLEOCIN) 150 MG capsule          Patient presents for right lower dental pain. On exam she does have pain and swelling to right lower tooth with dental cavities; no obvious dental abscess noted  DDX: Dental cavity vs dental abscess vs gum disease  Will rx peridex and clindamycin (pcn allergic)  Patient to take antibiotics as directed and follow up with dentist.     SUBJECTIVE/OBJECTIVE:    History provided by:  Patient   used: No    Dental Pain   Pertinent negatives include no fever.     HPI:   39 y.o. female presents with symptoms of dental pain ongoing since . She has been trying to get in with dentist. No fevers. She states that she is aware she needs these tooth removed. She has been using salt water and peroxide.     Vitals:    24 1252   BP: 136/87   Site: Left Upper Arm   Position: Sitting   Cuff Size: Large Adult   Pulse: 86   Temp: 98.3 °F (36.8 °C)   TempSrc: Oral   SpO2: 98%   Weight: 97.1 kg (214 lb)       Review of Systems   Constitutional:  Negative for chills, diaphoresis, fatigue and fever.   HENT:  Positive for dental problem.    Respiratory:  Negative for cough, chest tightness and shortness of breath.    Cardiovascular:  Negative for chest pain.   Gastrointestinal:  Negative for abdominal pain, constipation, diarrhea, nausea and vomiting.   Musculoskeletal:  Negative for neck pain and neck stiffness.   Skin:  Negative for rash.       Physical Exam  Vitals and nursing note reviewed.   Constitutional:       Appearance: Normal appearance.   HENT:      Head: Normocephalic and atraumatic.   Eyes:      Extraocular Movements: Extraocular movements intact.      Conjunctiva/sclera: Conjunctivae normal.   Cardiovascular:

## 2025-05-23 ENCOUNTER — OFFICE VISIT (OUTPATIENT)
Age: 40
End: 2025-05-23

## 2025-05-23 VITALS
HEART RATE: 75 BPM | RESPIRATION RATE: 18 BRPM | HEIGHT: 67 IN | WEIGHT: 214.4 LBS | OXYGEN SATURATION: 98 % | DIASTOLIC BLOOD PRESSURE: 73 MMHG | BODY MASS INDEX: 33.65 KG/M2 | TEMPERATURE: 97.6 F | SYSTOLIC BLOOD PRESSURE: 109 MMHG

## 2025-05-23 DIAGNOSIS — R21 RASH: Primary | ICD-10-CM

## 2025-05-23 RX ORDER — PREDNISONE 20 MG/1
20 TABLET ORAL DAILY
COMMUNITY
Start: 2025-05-07 | End: 2025-06-04

## 2025-05-23 RX ORDER — CETIRIZINE HYDROCHLORIDE 10 MG/1
TABLET ORAL
COMMUNITY

## 2025-05-23 RX ORDER — HYDROCORTISONE 25 MG/G
OINTMENT TOPICAL
Qty: 20 G | Refills: 0 | Status: SHIPPED | OUTPATIENT
Start: 2025-05-23 | End: 2025-05-30

## 2025-05-23 RX ORDER — HYDROCORTISONE 25 MG/G
OINTMENT TOPICAL
Qty: 20 G | Refills: 0 | Status: SHIPPED | OUTPATIENT
Start: 2025-05-23 | End: 2025-05-23

## 2025-05-23 ASSESSMENT — ENCOUNTER SYMPTOMS
CONSTIPATION: 0
ABDOMINAL PAIN: 0
VOMITING: 0
DIARRHEA: 0
COUGH: 0
NAUSEA: 0
SHORTNESS OF BREATH: 0
CHEST TIGHTNESS: 0

## 2025-05-23 NOTE — PROGRESS NOTES
Edwar Shane (:  1985) is a 39 y.o. female,Established patient, here for evaluation of the following chief complaint(s):  Bumps on arms (Mild irration/ itchy bumps on L arms x 1 week )      ASSESSMENT/PLAN:    ICD-10-CM    1. Rash  R21 hydrocortisone 2.5 % ointment     DISCONTINUED: hydrocortisone 2.5 % ointment          Patient presents for left shoulder pain ongoing for one week.   On exam: L arm: macular papular lesions noted to left upper arm  DDX: contact dermatitis vs eczema  Will rx hydrocortisone  Please use hydrocortisone cream  Please follow up with dermatologist if symptoms persist.   SUBJECTIVE/OBJECTIVE:    History provided by:  Patient   used: No      HPI:   39 y.o. female presents with symptoms of rash to left upper arm that has been ongoing for one week. No new soaps, creams, detergents, medications. No one at home has had similar symptoms. She has tried to use OTC cream to help with minimal relief, but it does help with itching.     Vitals:    25 1747   BP: 109/73   BP Site: Left Upper Arm   Patient Position: Sitting   BP Cuff Size: Large Adult   Pulse: 75   Resp: 18   Temp: 97.6 °F (36.4 °C)   TempSrc: Oral   SpO2: 98%   Weight: 97.3 kg (214 lb 6.4 oz)   Height: 1.695 m (5' 6.75\")       Review of Systems   Constitutional:  Negative for fatigue and fever.   Respiratory:  Negative for cough, chest tightness and shortness of breath.    Cardiovascular:  Negative for chest pain.   Gastrointestinal:  Negative for abdominal pain, constipation, diarrhea, nausea and vomiting.   Musculoskeletal:  Negative for neck pain and neck stiffness.   Skin:  Positive for rash.       Physical Exam  Vitals and nursing note reviewed.   Constitutional:       Appearance: Normal appearance.   HENT:      Head: Normocephalic and atraumatic.   Eyes:      Extraocular Movements: Extraocular movements intact.      Conjunctiva/sclera: Conjunctivae normal.   Cardiovascular:      Rate and

## 2025-06-27 ENCOUNTER — OFFICE VISIT (OUTPATIENT)
Age: 40
End: 2025-06-27

## 2025-06-27 VITALS
OXYGEN SATURATION: 97 % | TEMPERATURE: 98.1 F | DIASTOLIC BLOOD PRESSURE: 88 MMHG | HEIGHT: 66 IN | SYSTOLIC BLOOD PRESSURE: 154 MMHG | RESPIRATION RATE: 18 BRPM | BODY MASS INDEX: 32.47 KG/M2 | WEIGHT: 202 LBS | HEART RATE: 76 BPM

## 2025-06-27 DIAGNOSIS — K08.539 FRACTURE OF DENTAL RESTORATION: Primary | ICD-10-CM

## 2025-06-27 DIAGNOSIS — R03.0 ELEVATED BLOOD PRESSURE READING: ICD-10-CM

## 2025-06-27 RX ORDER — ERGOCALCIFEROL 1.25 MG/1
CAPSULE, LIQUID FILLED ORAL
COMMUNITY
Start: 2025-06-13

## 2025-06-27 RX ORDER — CLINDAMYCIN HYDROCHLORIDE 300 MG/1
300 CAPSULE ORAL 3 TIMES DAILY
Qty: 30 CAPSULE | Refills: 0 | Status: SHIPPED | OUTPATIENT
Start: 2025-06-27 | End: 2025-07-07

## 2025-06-27 RX ORDER — IBUPROFEN 600 MG/1
600 TABLET, FILM COATED ORAL 3 TIMES DAILY PRN
Qty: 30 TABLET | Refills: 0 | Status: SHIPPED | OUTPATIENT
Start: 2025-06-27

## 2025-06-27 RX ORDER — PREDNISONE 20 MG/1
20 TABLET ORAL DAILY
Qty: 5 TABLET | Refills: 0 | Status: SHIPPED | OUTPATIENT
Start: 2025-06-27 | End: 2025-07-02

## 2025-06-27 NOTE — PROGRESS NOTES
Edwar Shane (:  1985) is a 39 y.o. female,Established patient, here for evaluation of the following chief complaint(s):  Dental Pain (Pt c/o mouth pain since this morning, pt states she has an appt in 2 weeks for extraction)      ASSESSMENT/PLAN:    ICD-10-CM    1. Fracture of dental restoration  K08.539 clindamycin (CLEOCIN) 300 MG capsule     predniSONE (DELTASONE) 20 MG tablet     ibuprofen (ADVIL;MOTRIN) 600 MG tablet      2. Elevated blood pressure reading  R03.0 Amb Referral to Primary Care          Dx Disposition:dental fracture of restorative material   Education and handout provided on diagnosis and management of symptoms.   AVS reviewed with patient. Follow up as needed in UC or with PCP for new or worsening symptoms.   Return if symptoms worsen or fail to improve.    SUBJECTIVE/OBJECTIVE:  Patient presents today with complaints of right lower dental pain that started this morning has appointment in 2 weeks for tooth extraction      History provided by:  Patient   used: No    Dental Pain         Vitals:    [id number]   BP: (!) 154/93 (!) 154/88   Pulse: 76    Resp: 18    Temp: 98.1 °F (36.7 °C)    SpO2: 97%    Weight: 91.6 kg (202 lb)    Height: 1.676 m (5' 6\")        Review of Systems    Physical Exam  Constitutional:       Appearance: Normal appearance.   HENT:      Nose: Nose normal.      Mouth/Throat:      Mouth: Mucous membranes are moist.      Pharynx: Oropharynx is clear.        Comments: Missing multiple teeth and front are decayed and broken off  Cardiovascular:      Rate and Rhythm: Normal rate and regular rhythm.      Heart sounds: Normal heart sounds.   Pulmonary:      Effort: Pulmonary effort is normal.   Musculoskeletal:         General: Normal range of motion.   Skin:     General: Skin is warm and dry.   Neurological:      General: No focal deficit present.      Mental Status: She is alert and oriented to person, place, and time.

## 2025-06-27 NOTE — PATIENT INSTRUCTIONS
Thank you for allowing us to care for you today and we hope you feel better soon  New Prescriptions    CLINDAMYCIN (CLEOCIN) 300 MG CAPSULE    Take 1 capsule by mouth 3 times daily for 10 days    IBUPROFEN (ADVIL;MOTRIN) 600 MG TABLET    Take 1 tablet by mouth 3 times daily as needed for Pain    PREDNISONE (DELTASONE) 20 MG TABLET    Take 1 tablet by mouth daily for 5 days